# Patient Record
Sex: MALE | Race: WHITE | HISPANIC OR LATINO
[De-identification: names, ages, dates, MRNs, and addresses within clinical notes are randomized per-mention and may not be internally consistent; named-entity substitution may affect disease eponyms.]

---

## 2020-09-22 PROBLEM — Z00.00 ENCOUNTER FOR PREVENTIVE HEALTH EXAMINATION: Status: ACTIVE | Noted: 2020-09-22

## 2022-04-19 ENCOUNTER — TRANSCRIPTION ENCOUNTER (OUTPATIENT)
Age: 27
End: 2022-04-19

## 2022-05-03 ENCOUNTER — APPOINTMENT (OUTPATIENT)
Dept: ORTHOPEDIC SURGERY | Facility: CLINIC | Age: 27
End: 2022-05-03
Payer: COMMERCIAL

## 2022-05-03 VITALS — HEIGHT: 69 IN | WEIGHT: 210 LBS | BODY MASS INDEX: 31.1 KG/M2

## 2022-05-03 PROCEDURE — 99203 OFFICE O/P NEW LOW 30 MIN: CPT

## 2022-05-03 PROCEDURE — 73080 X-RAY EXAM OF ELBOW: CPT | Mod: LT

## 2022-05-03 NOTE — HISTORY OF PRESENT ILLNESS
[Sudden] : sudden [4] : 4 [0] : 0 [Dull/Aching] : dull/aching [Intermittent] : intermittent [Rest] : rest [Ice] : ice [Bending forward] : bending forward [de-identified] : 5/3/22: 25yo RHD M ([facility]) presents for LEFT elbow pain x 1 month after he banged it against a wall. Pain occurs randomly, mostly at night. Does not wake him from sleep. Also c/o tingling to volar ulnar hand and ring/small fingers if he touches his ulnar wrist. Initially occurred after he struck him elbow with a vacuum, but it resolved spontaneously, returned ~1mo ago. Reports that he does a lot of biking, but this does not exacerbate symptoms.\par Taking Advil PRN, which has helped. Using ice.\par \par Hx: none. [] : no [FreeTextEntry1] : Left elbow [FreeTextEntry5] : 05/03/2022: a 25 yo male, presents for left elbow injury evaluation sustained when  he bumped his left elbow on a wall  a month ago, he immediately started experiencing discomfort of the left elbow worse during the night and tingling of the left small and ring finger. no fever/swelling\par \par PMH\par had a burning sensation, tingling of the small and ring finger when his left elbow was hit by a vacuum, it resolved with ice application. [FreeTextEntry9] : Advil

## 2022-05-03 NOTE — ASSESSMENT
[FreeTextEntry1] : The condition was explained to the patient.\par - provided handout on activity/posture modification and night splint for CuTS.\par \par F/u 6wks.

## 2022-05-03 NOTE — IMAGING
[Left] : left elbow [de-identified] : LEFT UE:\par skin intact. no swelling.\par no TTP.\par elbow: good extension, flexion. good pronation, supination.\par wrist: good extension, flexion.\par hand: good EPL, FPL. good finger extension, flex to full fist. good finger abduction, adduction.\par SILT to median, ulnar, radial distributions.\par palpable radial pulse, brisk capillary refill all digits.\par no triggering.\par \par  5/5, 1st DI 5/5, APB 5/5.\par no ulnar nerve subluxation. negative Tinel's at cubital tunnel. negative elbow hyperflexion test.\par + Tinel's at wrist => tingling to RF/SF.\par  [FreeTextEntry1] : no acute displaced fracture or dislocation. well-corticated ossification distal to medial epicondyle.

## 2022-06-14 ENCOUNTER — APPOINTMENT (OUTPATIENT)
Dept: ORTHOPEDIC SURGERY | Facility: CLINIC | Age: 27
End: 2022-06-14
Payer: COMMERCIAL

## 2022-06-14 VITALS — WEIGHT: 210 LBS | BODY MASS INDEX: 31.1 KG/M2 | HEIGHT: 69 IN

## 2022-06-14 DIAGNOSIS — G56.22 LESION OF ULNAR NERVE, LEFT UPPER LIMB: ICD-10-CM

## 2022-06-14 PROCEDURE — 99213 OFFICE O/P EST LOW 20 MIN: CPT

## 2022-06-14 NOTE — IMAGING
[de-identified] : LEFT UE:\par skin intact. no swelling.\par no TTP.\par elbow: good extension, flexion. good pronation, supination.\par wrist: good extension, flexion.\par hand: good EPL, FPL. good finger extension, flex to full fist. good finger abduction, adduction.\par SILT to median, ulnar, radial distributions.\par palpable radial pulse, brisk capillary refill all digits.\par no triggering.\par \par  5/5, 1st DI 5/5, APB 5/5.\par no ulnar nerve subluxation. negative Tinel's at cubital tunnel. negative elbow hyperflexion test.\par negative Tinel's at wrist.\par

## 2022-06-14 NOTE — ASSESSMENT
[FreeTextEntry1] : - continue activity/posture modification and night splint for CuTS until symptoms resolve.\par \par F/u PRN.

## 2022-06-14 NOTE — HISTORY OF PRESENT ILLNESS
[Intermittent] : intermittent [Rest] : rest [de-identified] : 6/14/22: f/u LEFT CuTS. pain is much better, only 2 episodes since last visit. tingling is rare. sleeping with cubital tunnel night splint.\par \par 5/3/22: 27yo RHD M ([facility]) presents for LEFT elbow pain x 1 month after he banged it against a wall. Pain occurs randomly, mostly at night. Does not wake him from sleep. Also c/o tingling to volar ulnar hand and ring/small fingers if he touches his ulnar wrist. Initially occurred after he struck him elbow with a vacuum, but it resolved spontaneously, returned ~1mo ago. Reports that he does a lot of biking, but this does not exacerbate symptoms.\par Taking Advil PRN, which has helped. Using ice.\par \par Hx: none. [] : no [FreeTextEntry1] : left elbow [FreeTextEntry5] : pr reports pain is better since last visit. less "popping: in elbow and less pain. pt is using brace when sleeping at night. minimal N/T in hand.  [FreeTextEntry9] : brace

## 2023-09-13 ENCOUNTER — NON-APPOINTMENT (OUTPATIENT)
Age: 28
End: 2023-09-13

## 2023-09-18 ENCOUNTER — NON-APPOINTMENT (OUTPATIENT)
Age: 28
End: 2023-09-18

## 2023-10-05 ENCOUNTER — EMERGENCY (EMERGENCY)
Facility: HOSPITAL | Age: 28
LOS: 1 days | Discharge: HOME | End: 2023-10-05
Attending: EMERGENCY MEDICINE
Payer: COMMERCIAL

## 2023-10-05 ENCOUNTER — APPOINTMENT (OUTPATIENT)
Dept: GASTROENTEROLOGY | Facility: CLINIC | Age: 28
End: 2023-10-05

## 2023-10-05 VITALS
SYSTOLIC BLOOD PRESSURE: 125 MMHG | OXYGEN SATURATION: 95 % | HEART RATE: 62 BPM | DIASTOLIC BLOOD PRESSURE: 79 MMHG | HEIGHT: 69 IN | WEIGHT: 225.09 LBS | TEMPERATURE: 99 F | RESPIRATION RATE: 20 BRPM

## 2023-10-05 VITALS
DIASTOLIC BLOOD PRESSURE: 79 MMHG | RESPIRATION RATE: 16 BRPM | SYSTOLIC BLOOD PRESSURE: 123 MMHG | TEMPERATURE: 99 F | OXYGEN SATURATION: 96 % | HEART RATE: 65 BPM

## 2023-10-05 PROCEDURE — 71046 X-RAY EXAM CHEST 2 VIEWS: CPT | Mod: 26

## 2023-10-05 PROCEDURE — 93005 ELECTROCARDIOGRAM TRACING: CPT

## 2023-10-05 PROCEDURE — 99284 EMERGENCY DEPT VISIT MOD MDM: CPT

## 2023-10-05 PROCEDURE — 99283 EMERGENCY DEPT VISIT LOW MDM: CPT | Mod: 25

## 2023-10-05 PROCEDURE — 71046 X-RAY EXAM CHEST 2 VIEWS: CPT

## 2023-10-05 RX ORDER — FAMOTIDINE 10 MG/ML
20 INJECTION INTRAVENOUS ONCE
Refills: 0 | Status: COMPLETED | OUTPATIENT
Start: 2023-10-05 | End: 2023-10-05

## 2023-10-05 RX ORDER — FAMOTIDINE 10 MG/ML
1 INJECTION INTRAVENOUS
Qty: 28 | Refills: 0
Start: 2023-10-05 | End: 2023-10-18

## 2023-10-05 RX ADMIN — Medication 30 MILLILITER(S): at 10:56

## 2023-10-05 RX ADMIN — FAMOTIDINE 20 MILLIGRAM(S): 10 INJECTION INTRAVENOUS at 10:56

## 2023-10-05 NOTE — ED PROVIDER NOTE - NS ED MD DISPO DISCHARGE CCDA
Patient/Caregiver provided printed discharge information. Excisional Biopsy Additional Text (Leave Blank If You Do Not Want): The margin was drawn around the clinically apparent lesion. An elliptical shape was then drawn on the skin incorporating the lesion and margins.  Incisions were then made along these lines to the appropriate tissue plane and the lesion was extirpated.

## 2023-10-05 NOTE — ED PROVIDER NOTE - NSDCPRINTRESULTS_ED_ALL_ED
Ketoconazole Counseling:   Patient counseled regarding improving absorption with orange juice.  Adverse effects include but are not limited to breast enlargement, headache, diarrhea, nausea, upset stomach, liver function test abnormalities, taste disturbance, and stomach pain.  There is a rare possibility of liver failure that can occur when taking ketoconazole. The patient understands that monitoring of LFTs may be required, especially at baseline. The patient verbalized understanding of the proper use and possible adverse effects of ketoconazole.  All of the patient's questions and concerns were addressed. Patient requests all Lab, Cardiology, and Radiology Results on their Discharge Instructions

## 2023-10-05 NOTE — ED ADULT NURSE NOTE - OBJECTIVE STATEMENT
Received a 26M bibems from a parking lot going to work but patient felt chest discomfort and called the EMS, patient reports burping a lot, no nausea, vomiting, chills or fever. But reports sob that was resolved now and the discomfort was resolved as well. VS WDL.  Patient reports h/o heart murmur when he was a kid, no PMD.

## 2023-10-05 NOTE — ED PROVIDER NOTE - PATIENT PORTAL LINK FT
You can access the FollowMyHealth Patient Portal offered by [facility] by registering at the following website: [email or web address]. By joining [employer]’s FollowMyHealth portal, you will also be able to view your health information using other applications (apps) compatible with our system.

## 2023-10-05 NOTE — ED PROVIDER NOTE - NSFOLLOWUPINSTRUCTIONS_ED_ALL_ED_FT
Follow up with your doctor in 2-3 days.    Take famotidine 20mg twice a day for 2 weeks.    Return to the ED for worse pain, vomiting, fever or other emergent concerns.

## 2023-10-05 NOTE — ED PROVIDER NOTE - CLINICAL SUMMARY MEDICAL DECISION MAKING FREE TEXT BOX
Chest pain and belching.  Symptoms are very atypical for acute coronary syndrome.  Symptoms are more likely to be a non-life-threatening cause of chest pain such as gastritis or reflux.  We will get an EKG to screen for ACS.  Will get a chest x-ray.  Will treat with Maalox and famotidine.  Will likely discharge with a course of acid reducing medication and GI follow-up

## 2023-10-05 NOTE — ED POST DISCHARGE NOTE - ADDITIONAL DOCUMENTATION
10/5/23: Patient called asking for prescription for Famotidine. Per discharge note "Take famotidine 20mg twice a day for 2 weeks." Sent prescription to patient's desired pharmacy.  -Renetta Seaman PA-C

## 2023-10-05 NOTE — ED PROVIDER NOTE - OBJECTIVE STATEMENT
Attendin-year-old male with no significant past medical history presents with chest discomfort associated with belching this morning.  Patient had an iced coffee and muffin for breakfast.  Since then he has been having discomfort in the upper chest associated with frequent belching and gas.  He has no diarrhea.  He has no abdominal pain.  Of note patient had Cryptosporidium infection about 3 weeks ago and has had increased gas since then.  No fever or chills.  Normal appetite.  He has no exertional symptoms.  He has no shortness of breath.

## 2023-10-18 ENCOUNTER — APPOINTMENT (OUTPATIENT)
Dept: GASTROENTEROLOGY | Facility: CLINIC | Age: 28
End: 2023-10-18
Payer: COMMERCIAL

## 2023-10-18 VITALS
HEART RATE: 54 BPM | OXYGEN SATURATION: 98 % | BODY MASS INDEX: 33.33 KG/M2 | HEIGHT: 69 IN | DIASTOLIC BLOOD PRESSURE: 84 MMHG | SYSTOLIC BLOOD PRESSURE: 131 MMHG | TEMPERATURE: 98.2 F | WEIGHT: 225 LBS

## 2023-10-18 DIAGNOSIS — K21.9 GASTRO-ESOPHAGEAL REFLUX DISEASE W/OUT ESOPHAGITIS: ICD-10-CM

## 2023-10-18 DIAGNOSIS — A09 INFECTIOUS GASTROENTERITIS AND COLITIS, UNSPECIFIED: ICD-10-CM

## 2023-10-18 DIAGNOSIS — R11.0 NAUSEA: ICD-10-CM

## 2023-10-18 DIAGNOSIS — R10.13 EPIGASTRIC PAIN: ICD-10-CM

## 2023-10-18 DIAGNOSIS — R07.89 OTHER CHEST PAIN: ICD-10-CM

## 2023-10-18 DIAGNOSIS — Z78.9 OTHER SPECIFIED HEALTH STATUS: ICD-10-CM

## 2023-10-18 PROCEDURE — 99204 OFFICE O/P NEW MOD 45 MIN: CPT

## 2023-10-18 RX ORDER — FAMOTIDINE 40 MG/1
40 TABLET, FILM COATED ORAL
Qty: 60 | Refills: 3 | Status: ACTIVE | COMMUNITY
Start: 2023-10-18 | End: 1900-01-01

## 2023-10-18 RX ORDER — FAMOTIDINE 10 MG/1
TABLET, FILM COATED ORAL
Refills: 0 | Status: ACTIVE | COMMUNITY

## 2023-10-18 RX ORDER — SIMETHICONE 125 MG/1
125 TABLET, CHEWABLE ORAL
Qty: 120 | Refills: 2 | Status: ACTIVE | COMMUNITY
Start: 2023-10-18 | End: 1900-01-01

## 2023-10-19 ENCOUNTER — NON-APPOINTMENT (OUTPATIENT)
Age: 28
End: 2023-10-19

## 2023-10-20 ENCOUNTER — NON-APPOINTMENT (OUTPATIENT)
Age: 28
End: 2023-10-20

## 2023-10-26 ENCOUNTER — NON-APPOINTMENT (OUTPATIENT)
Age: 28
End: 2023-10-26

## 2023-10-27 ENCOUNTER — LABORATORY RESULT (OUTPATIENT)
Age: 28
End: 2023-10-27

## 2023-10-27 LAB — UREA BREATH TEST QL: NEGATIVE

## 2023-10-30 ENCOUNTER — NON-APPOINTMENT (OUTPATIENT)
Age: 28
End: 2023-10-30

## 2023-10-31 ENCOUNTER — NON-APPOINTMENT (OUTPATIENT)
Age: 28
End: 2023-10-31

## 2023-11-07 ENCOUNTER — NON-APPOINTMENT (OUTPATIENT)
Age: 28
End: 2023-11-07

## 2024-02-24 ENCOUNTER — NON-APPOINTMENT (OUTPATIENT)
Age: 29
End: 2024-02-24

## 2024-03-12 ENCOUNTER — NON-APPOINTMENT (OUTPATIENT)
Age: 29
End: 2024-03-12

## 2024-03-20 ENCOUNTER — NON-APPOINTMENT (OUTPATIENT)
Age: 29
End: 2024-03-20

## 2024-07-22 ENCOUNTER — APPOINTMENT (OUTPATIENT)
Dept: GASTROENTEROLOGY | Facility: CLINIC | Age: 29
End: 2024-07-22